# Patient Record
Sex: MALE | Race: WHITE | NOT HISPANIC OR LATINO | URBAN - METROPOLITAN AREA
[De-identification: names, ages, dates, MRNs, and addresses within clinical notes are randomized per-mention and may not be internally consistent; named-entity substitution may affect disease eponyms.]

---

## 2018-09-18 ENCOUNTER — OFFICE VISIT (OUTPATIENT)
Dept: URBAN - METROPOLITAN AREA CLINIC 48 | Facility: CLINIC | Age: 83
End: 2018-09-18
Payer: MEDICARE

## 2018-09-18 PROCEDURE — 92012 INTRM OPH EXAM EST PATIENT: CPT | Performed by: OPHTHALMOLOGY

## 2018-09-18 RX ORDER — ATROPINE SULFATE 10 MG/ML
1 % SOLUTION/ DROPS OPHTHALMIC
Qty: 1 | Refills: 2 | Status: INACTIVE
Start: 2018-09-18 | End: 2018-09-24

## 2018-09-18 RX ORDER — PREDNISOLONE ACETATE 10 MG/ML
1 % SUSPENSION/ DROPS OPHTHALMIC
Qty: 1 | Refills: 3 | Status: ACTIVE
Start: 2018-09-18

## 2018-09-18 NOTE — IMPRESSION/PLAN
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 Plan: Discussed diagnosis in detail with patient. Discussed treatment options with patient. Start Predforte q2h od & Atropine bid od. 

RTC 
next monday

## 2018-09-24 ENCOUNTER — OFFICE VISIT (OUTPATIENT)
Dept: URBAN - METROPOLITAN AREA CLINIC 48 | Facility: CLINIC | Age: 83
End: 2018-09-24
Payer: MEDICARE

## 2018-09-24 DIAGNOSIS — H20.021 RECURRENT ACUTE IRIDOCYCLITIS, RIGHT EYE: Primary | ICD-10-CM

## 2018-09-24 PROCEDURE — 92014 COMPRE OPH EXAM EST PT 1/>: CPT | Performed by: OPHTHALMOLOGY

## 2018-09-24 ASSESSMENT — INTRAOCULAR PRESSURE
OD: 18
OS: 18

## 2021-02-09 NOTE — IMPRESSION/PLAN
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 Plan: Improving. Patient to taper PF by 1 gtt every week, Will continue to observe condition and or symptoms. Patient instructed to call if condition gets worse.  PF 6 x 1 wk, 5 x 1 wk, qid x 1 wk, tid x 1 wk, bid x 1 wk qd x 1 wk then d/c
EOMI; PERRL; no drainage or redness